# Patient Record
Sex: FEMALE | Race: ASIAN | NOT HISPANIC OR LATINO | Employment: UNEMPLOYED | URBAN - METROPOLITAN AREA
[De-identification: names, ages, dates, MRNs, and addresses within clinical notes are randomized per-mention and may not be internally consistent; named-entity substitution may affect disease eponyms.]

---

## 2017-05-29 ENCOUNTER — HOSPITAL ENCOUNTER (EMERGENCY)
Facility: HOSPITAL | Age: 27
Discharge: HOME | End: 2017-05-29
Attending: EMERGENCY MEDICINE | Admitting: EMERGENCY MEDICINE

## 2017-05-29 VITALS
HEIGHT: 61 IN | BODY MASS INDEX: 23.6 KG/M2 | DIASTOLIC BLOOD PRESSURE: 84 MMHG | TEMPERATURE: 98.6 F | SYSTOLIC BLOOD PRESSURE: 130 MMHG | OXYGEN SATURATION: 99 % | RESPIRATION RATE: 17 BRPM | WEIGHT: 125 LBS | HEART RATE: 93 BPM

## 2017-05-29 DIAGNOSIS — G47.00 INSOMNIA, UNSPECIFIED TYPE: Primary | ICD-10-CM

## 2017-05-29 LAB
AMPHET+METHAMPHET UR QL: NEGATIVE
ANION GAP SERPL CALCULATED.3IONS-SCNC: 17.8 MMOL/L
BARBITURATES UR QL SCN: NEGATIVE
BASOPHILS # BLD AUTO: 0.03 10*3/MM3 (ref 0–0.2)
BASOPHILS NFR BLD AUTO: 0.4 % (ref 0–1.5)
BENZODIAZ UR QL SCN: NEGATIVE
BUN BLD-MCNC: 8 MG/DL (ref 6–20)
BUN/CREAT SERPL: 17.4 (ref 7–25)
CALCIUM SPEC-SCNC: 9.5 MG/DL (ref 8.6–10.5)
CANNABINOIDS SERPL QL: NEGATIVE
CHLORIDE SERPL-SCNC: 91 MMOL/L (ref 98–107)
CO2 SERPL-SCNC: 21.2 MMOL/L (ref 22–29)
COCAINE UR QL: NEGATIVE
CREAT BLD-MCNC: 0.46 MG/DL (ref 0.57–1)
DEPRECATED RDW RBC AUTO: 48.1 FL (ref 37–54)
EOSINOPHIL # BLD AUTO: 0.08 10*3/MM3 (ref 0–0.7)
EOSINOPHIL NFR BLD AUTO: 1 % (ref 0.3–6.2)
ERYTHROCYTE [DISTWIDTH] IN BLOOD BY AUTOMATED COUNT: 15 % (ref 11.7–13)
ETHANOL BLD-MCNC: <10 MG/DL (ref 0–10)
ETHANOL UR QL: <0.01 %
GFR SERPL CREATININE-BSD FRML MDRD: >150 ML/MIN/1.73
GFR SERPL CREATININE-BSD FRML MDRD: >150 ML/MIN/1.73
GLUCOSE BLD-MCNC: 99 MG/DL (ref 65–99)
HCT VFR BLD AUTO: 39.4 % (ref 35.6–45.5)
HGB BLD-MCNC: 13 G/DL (ref 11.9–15.5)
IMM GRANULOCYTES # BLD: 0.05 10*3/MM3 (ref 0–0.03)
IMM GRANULOCYTES NFR BLD: 0.6 % (ref 0–0.5)
LYMPHOCYTES # BLD AUTO: 1.86 10*3/MM3 (ref 0.9–4.8)
LYMPHOCYTES NFR BLD AUTO: 23.5 % (ref 19.6–45.3)
MCH RBC QN AUTO: 29 PG (ref 26.9–32)
MCHC RBC AUTO-ENTMCNC: 33 G/DL (ref 32.4–36.3)
MCV RBC AUTO: 87.8 FL (ref 80.5–98.2)
METHADONE UR QL SCN: NEGATIVE
MONOCYTES # BLD AUTO: 0.54 10*3/MM3 (ref 0.2–1.2)
MONOCYTES NFR BLD AUTO: 6.8 % (ref 5–12)
NEUTROPHILS # BLD AUTO: 5.36 10*3/MM3 (ref 1.9–8.1)
NEUTROPHILS NFR BLD AUTO: 67.7 % (ref 42.7–76)
OPIATES UR QL: NEGATIVE
OXYCODONE UR QL SCN: NEGATIVE
PLATELET # BLD AUTO: 317 10*3/MM3 (ref 140–500)
PMV BLD AUTO: 9.9 FL (ref 6–12)
POTASSIUM BLD-SCNC: 3.6 MMOL/L (ref 3.5–5.2)
RBC # BLD AUTO: 4.49 10*6/MM3 (ref 3.9–5.2)
SODIUM BLD-SCNC: 130 MMOL/L (ref 136–145)
WBC NRBC COR # BLD: 7.92 10*3/MM3 (ref 4.5–10.7)

## 2017-05-29 PROCEDURE — 80307 DRUG TEST PRSMV CHEM ANLYZR: CPT | Performed by: NURSE PRACTITIONER

## 2017-05-29 PROCEDURE — 85025 COMPLETE CBC W/AUTO DIFF WBC: CPT | Performed by: NURSE PRACTITIONER

## 2017-05-29 PROCEDURE — 99283 EMERGENCY DEPT VISIT LOW MDM: CPT

## 2017-05-29 PROCEDURE — 90791 PSYCH DIAGNOSTIC EVALUATION: CPT

## 2017-05-29 PROCEDURE — 80048 BASIC METABOLIC PNL TOTAL CA: CPT | Performed by: NURSE PRACTITIONER

## 2017-05-29 RX ORDER — IBUPROFEN 800 MG/1
800 TABLET ORAL ONCE
Status: COMPLETED | OUTPATIENT
Start: 2017-05-29 | End: 2017-05-29

## 2017-05-29 RX ORDER — IBUPROFEN 600 MG/1
600 TABLET ORAL EVERY 6 HOURS PRN
COMMUNITY

## 2017-05-29 RX ORDER — HYDROXYZINE PAMOATE 25 MG/1
25 CAPSULE ORAL 3 TIMES DAILY PRN
Status: DISCONTINUED | OUTPATIENT
Start: 2017-05-29 | End: 2017-05-29 | Stop reason: HOSPADM

## 2017-05-29 RX ORDER — HYDROXYZINE PAMOATE 25 MG/1
25 CAPSULE ORAL NIGHTLY PRN
Qty: 6 CAPSULE | Refills: 0 | Status: SHIPPED | OUTPATIENT
Start: 2017-05-29

## 2017-05-29 RX ADMIN — IBUPROFEN 800 MG: 800 TABLET ORAL at 21:36

## 2017-05-29 RX ADMIN — HYDROXYZINE PAMOATE 25 MG: 25 CAPSULE ORAL at 21:36

## 2017-05-31 PROCEDURE — 99282 EMERGENCY DEPT VISIT SF MDM: CPT

## 2017-05-31 RX ORDER — HYDROCODONE BITARTRATE AND ACETAMINOPHEN 10; 325 MG/1; MG/1
1 TABLET ORAL EVERY 6 HOURS PRN
COMMUNITY

## 2017-06-01 ENCOUNTER — HOSPITAL ENCOUNTER (EMERGENCY)
Facility: HOSPITAL | Age: 27
Discharge: HOME | End: 2017-06-01
Attending: EMERGENCY MEDICINE | Admitting: EMERGENCY MEDICINE

## 2017-06-01 VITALS
TEMPERATURE: 97.4 F | HEART RATE: 100 BPM | DIASTOLIC BLOOD PRESSURE: 95 MMHG | HEIGHT: 61 IN | SYSTOLIC BLOOD PRESSURE: 130 MMHG | OXYGEN SATURATION: 100 % | RESPIRATION RATE: 18 BRPM | BODY MASS INDEX: 23.6 KG/M2 | WEIGHT: 125 LBS

## 2017-06-01 PROCEDURE — 90791 PSYCH DIAGNOSTIC EVALUATION: CPT

## 2017-06-01 NOTE — ED PROVIDER NOTES
EMERGENCY DEPARTMENT ENCOUNTER    CHIEF COMPLAINT  Chief Complaint: post-partum depression  History given by: patient, sposue  History limited by: [facility]os[facility] historian  Room Number: 03/03  PMD: Nomi Reno MD      HPI:  Pt is a 26 y.o. female who presents complaining of depression and trouble sleeping for the last 6 days. Pt's spouse states that they were seen in the ED on 5-29-17 for the same symptoms and was given the choice of inpatient or outpatient counseling. Pt's spouse states that they chose outpatient counseling at that time and was started on Vistaril for her trouble sleeping. Pt's spouse states that the pt has been taking Vistaril with mild relief but continues to complain of agitation and anxiety. Pt's spouse states that the pt seems disconnected and is repetitive in conversation. Pt's spouse states that the pt gave birth on 5-13-17 w/o complication and is still breastfeeding. Pt complains of a sharp back pain and states that Norco has not helped her back pain    Duration:  6 days  Onset: gradual  Timing: constant  Location: generalized  Radiation: N/A  Quality: post-partum depression  Intensity/Severity: moderate  Progression: worsening  Associated Symptoms: trouble sleeping, agitation, anxiety, disconnection, sharp back pain  Aggravating Factors: none  Alleviating Factors: none  Previous Episodes: none mentioned  Treatment before arrival: Pt's spouse states that the pt has been taking Vistaril with mild relief but continues to complain of agitation and anxiety.    PAST MEDICAL HISTORY  Active Ambulatory Problems     Diagnosis Date Noted   • No Active Ambulatory Problems     Resolved Ambulatory Problems     Diagnosis Date Noted   • No Resolved Ambulatory Problems     No Additional Past Medical History       PAST SURGICAL HISTORY  History reviewed. No pertinent surgical history.    FAMILY HISTORY  History reviewed. No pertinent family history.    SOCIAL HISTORY  Social History     Social History   •  Marital status:      Spouse name: N/A   • Number of children: N/A   • Years of education: N/A     Occupational History   • Not on file.     Social History Main Topics   • Smoking status: Never Smoker   • Smokeless tobacco: Not on file   • Alcohol use No   • Drug use: No   • Sexual activity: Defer     Other Topics Concern   • Not on file     Social History Narrative       ALLERGIES  Review of patient's allergies indicates no known allergies.    REVIEW OF SYSTEMS  Review of Systems   Constitutional: Negative for fever.   HENT: Negative for sore throat.    Eyes: Negative.    Respiratory: Negative for cough and shortness of breath.    Cardiovascular: Negative for chest pain.   Gastrointestinal: Negative for abdominal pain, diarrhea and vomiting.   Genitourinary: Negative for dysuria.   Musculoskeletal: Positive for back pain (sharp). Negative for neck pain.   Skin: Negative for rash.   Allergic/Immunologic: Negative.    Neurological: Negative for weakness, numbness and headaches.   Hematological: Negative.    Psychiatric/Behavioral: Positive for agitation and sleep disturbance. The patient is nervous/anxious.         Depression, disconnected   All other systems reviewed and are negative.      PHYSICAL EXAM  ED Triage Vitals   Temp Heart Rate Resp BP SpO2   05/31/17 2339 05/31/17 2339 05/31/17 2339 05/31/17 2345 05/31/17 2339   97 °F (36.1 °C) 114 18 141/109 99 %      Temp src Heart Rate Source Patient Position BP Location FiO2 (%)   -- -- -- -- --              Physical Exam   Constitutional: She is oriented to person, place, and time and well-developed, well-nourished, and in no distress. No distress.   HENT:   Head: Normocephalic and atraumatic.   Eyes: EOM are normal. Pupils are equal, round, and reactive to light.   Neck: Normal range of motion. Neck supple.   Cardiovascular: Normal rate, regular rhythm and normal heart sounds.    Pulmonary/Chest: Effort normal and breath sounds normal. No respiratory  distress.   Abdominal: Soft. There is no tenderness. There is no rebound and no guarding.   Musculoskeletal: Normal range of motion. She exhibits no edema.   Neurological: She is alert and oriented to person, place, and time. She has normal sensation and normal strength.   Skin: Skin is warm and dry. No rash noted.   Psychiatric: Mood and affect normal.   Nursing note and vitals reviewed.    PROCEDURES  Procedures      PROGRESS AND CONSULTS  ED Course     0343- Ordered blood work, UA, TSH, UDS and ethanol level for further evaluation.    0414- Discussed the pt's case with Ameya ([facility]) who agrees to consult.    0431- Per RN, the pt was unable to collect an urine sample. Ameya states that he does not need lab work at this time.    0432- Turned over care to Dr. Witt, pending ACCESS evaluation and disposition.     MEDICAL DECISION MAKING  Results were reviewed/discussed with the patient and they were also made aware of online access. Pt also made aware that follow up with PMD is necessary.     MDM  Number of Diagnoses or Management Options     Amount and/or Complexity of Data Reviewed  Decide to obtain previous medical records or to obtain history from someone other than the patient: yes  Obtain history from someone other than the patient: yes (family)  Review and summarize past medical records: yes (Pt was seen in the ED on 5-29-17 for insomnia and post-partum depression. Pt was discharged home on Vistaril.)           DISPOSITION  CARE TURNED OVER TO DR. WITT PENDING ACCESS EVALUATION AND DISPOSITION    Latest Documented Vital Signs:  As of 5:06 AM  BP- (!) 141/109 HR- 114 Temp- 97 °F (36.1 °C) O2 sat- 99%    --  Documentation assistance provided by danielle Lara for William Otoole PA-C.  Information recorded by the danielle was done at my direction and has been verified and validated by me.     Lelo Lara  06/01/17 1579       SHANNAN Aceves III  06/01/17 0346

## 2017-06-01 NOTE — ED NOTES
Pt has been having extreme mood swings since Sunday. Pt is approx 9 days post partum. Pt was evaluated on Monday in ED.      Christi Leonardo RN  05/31/17 2579

## 2017-06-01 NOTE — ED NOTES
Pt has been doing well until Saturday. Now feels depressed, increasing with each day. Pt was seen earlier this week on Monday. Pt went home with meds. Pt usually sleeps, but feels nervous when awake. States that she feels like she is not taking care of the baby. Symptoms started 2 weeks after delivery. Pt states sharp pain in back that medicine is not helping. - Most information provided by .      Janet Sidhu RN  06/01/17 5791

## 2017-06-01 NOTE — CONSULTS
"On approach, pt declines translation services and states that English is her preferred language for health care. Pt's  and mom present in ED, Gianluca Blair, [phone], Juan Luis Maurice (same number).     Pt declined to provide BAL and UDS samples. Does not appear intoxicated. BAL and UDS were both negative on 5/29/17. Family and pt both deny and alcohol or drug use by pt.     Interviewed separately per pt request. Asked what problems brought her into the ED, reports \"I'm not concentrating ... I'm feeling that my eyesight is becoming weak. I feel like a sharp pain in my back.\" Pt denies recent depression, \"I have no depression\". Pt does report difficulty sleeping, \"I'm sleeping but I'm not feeling like I'm sleeping\", on f/u, reports she awakens frequently and does not experience restful sleep. Pt denies experienceany recent thoughts of harming herself or others. Pt denies experiencing any abuse or feeling threatened by family. Pt declines to share PHI with family.     Pt denies any current outpatient mental health support. Pt then states, \"I'm not hurting anyone, I've been depressed since Friday\" (contradicting her prior statement). Pt then again reports that she is getting sleep, but that she is not getting restful sleep.     Pt denies any hx of suicide attempts. Pt denies hx of physical aggression towards others.     Pt reports she feels safe to go home tonight, \"yeah I feel safe to go home\". Pt then abruptly ends interview, asking her  to be present for any further questions. Pt then provides verbal consent to share PHI with .      denies any hx of suicide attempts or physical aggression towards others.  denies any recent indications of thoughts of harming herself or others by pt.      reports that, \"the major problem with her is sleep. She closes her eyes for just a few minutes and then she wakes up again.\"  also reports mood swings, and pt's report of " "anxiety that she is not doing well in her role as a new mother.  reports pt saw a psychologist yesterday, prior to arrival, spoke briefly with psychologist who reportedly told family pt needs psychiatrist referral for medications to help her with sleep.  reports that pt was able to sleep about 6 hours last night, but that the vistaril given here in ED on 5/29/17 was ineffective for pt's sleep tonight.      denies any AVH: \"no, no delusions or hallucinations. No harming people or hurting her baby.\"  reports he does feel safe with d/c with outpatient referrals.  also reports pt has missed her last 3 appointments. I encouraged pt and  for pt to make her appointments in the future. Pt and  also accepted education about postpartum depression.     Plan to d/c with outpatient psychiatric referrals. Dr. Ambrocio agrees with plan.           "